# Patient Record
Sex: MALE | Race: BLACK OR AFRICAN AMERICAN | NOT HISPANIC OR LATINO
[De-identification: names, ages, dates, MRNs, and addresses within clinical notes are randomized per-mention and may not be internally consistent; named-entity substitution may affect disease eponyms.]

---

## 2017-01-26 ENCOUNTER — OTHER (OUTPATIENT)
Age: 22
End: 2017-01-26

## 2018-12-07 ENCOUNTER — EMERGENCY (EMERGENCY)
Facility: HOSPITAL | Age: 23
LOS: 1 days | Discharge: HOME | End: 2018-12-07
Attending: EMERGENCY MEDICINE | Admitting: EMERGENCY MEDICINE
Payer: MEDICAID

## 2018-12-07 VITALS
SYSTOLIC BLOOD PRESSURE: 170 MMHG | RESPIRATION RATE: 18 BRPM | DIASTOLIC BLOOD PRESSURE: 80 MMHG | HEART RATE: 88 BPM | TEMPERATURE: 98 F

## 2018-12-07 VITALS
RESPIRATION RATE: 20 BRPM | DIASTOLIC BLOOD PRESSURE: 76 MMHG | OXYGEN SATURATION: 100 % | SYSTOLIC BLOOD PRESSURE: 157 MMHG | TEMPERATURE: 98 F | HEART RATE: 72 BPM

## 2018-12-07 LAB
ALBUMIN SERPL ELPH-MCNC: 4.3 G/DL — SIGNIFICANT CHANGE UP (ref 3.3–5)
ALP SERPL-CCNC: 94 U/L — SIGNIFICANT CHANGE UP (ref 40–120)
ALT FLD-CCNC: 24 U/L — SIGNIFICANT CHANGE UP (ref 4–41)
APTT BLD: 37.1 SEC — HIGH (ref 27.5–36.3)
AST SERPL-CCNC: 17 U/L — SIGNIFICANT CHANGE UP (ref 4–40)
BASOPHILS # BLD AUTO: 0.04 K/UL — SIGNIFICANT CHANGE UP (ref 0–0.2)
BASOPHILS NFR BLD AUTO: 0.6 % — SIGNIFICANT CHANGE UP (ref 0–2)
BILIRUB SERPL-MCNC: 0.3 MG/DL — SIGNIFICANT CHANGE UP (ref 0.2–1.2)
BUN SERPL-MCNC: 12 MG/DL — SIGNIFICANT CHANGE UP (ref 7–23)
CALCIUM SERPL-MCNC: 9.5 MG/DL — SIGNIFICANT CHANGE UP (ref 8.4–10.5)
CHLORIDE SERPL-SCNC: 106 MMOL/L — SIGNIFICANT CHANGE UP (ref 98–107)
CO2 SERPL-SCNC: 23 MMOL/L — SIGNIFICANT CHANGE UP (ref 22–31)
CREAT SERPL-MCNC: 1.09 MG/DL — SIGNIFICANT CHANGE UP (ref 0.5–1.3)
EOSINOPHIL # BLD AUTO: 0.22 K/UL — SIGNIFICANT CHANGE UP (ref 0–0.5)
EOSINOPHIL NFR BLD AUTO: 3 % — SIGNIFICANT CHANGE UP (ref 0–6)
GLUCOSE SERPL-MCNC: 83 MG/DL — SIGNIFICANT CHANGE UP (ref 70–99)
HCT VFR BLD CALC: 42.5 % — SIGNIFICANT CHANGE UP (ref 39–50)
HGB BLD-MCNC: 13.6 G/DL — SIGNIFICANT CHANGE UP (ref 13–17)
IMM GRANULOCYTES # BLD AUTO: 0.02 # — SIGNIFICANT CHANGE UP
IMM GRANULOCYTES NFR BLD AUTO: 0.3 % — SIGNIFICANT CHANGE UP (ref 0–1.5)
INR BLD: 1.31 — HIGH (ref 0.88–1.17)
LYMPHOCYTES # BLD AUTO: 2.34 K/UL — SIGNIFICANT CHANGE UP (ref 1–3.3)
LYMPHOCYTES # BLD AUTO: 32.4 % — SIGNIFICANT CHANGE UP (ref 13–44)
MCHC RBC-ENTMCNC: 27.5 PG — SIGNIFICANT CHANGE UP (ref 27–34)
MCHC RBC-ENTMCNC: 32 % — SIGNIFICANT CHANGE UP (ref 32–36)
MCV RBC AUTO: 85.9 FL — SIGNIFICANT CHANGE UP (ref 80–100)
MONOCYTES # BLD AUTO: 0.67 K/UL — SIGNIFICANT CHANGE UP (ref 0–0.9)
MONOCYTES NFR BLD AUTO: 9.3 % — SIGNIFICANT CHANGE UP (ref 2–14)
NEUTROPHILS # BLD AUTO: 3.93 K/UL — SIGNIFICANT CHANGE UP (ref 1.8–7.4)
NEUTROPHILS NFR BLD AUTO: 54.4 % — SIGNIFICANT CHANGE UP (ref 43–77)
NRBC # FLD: 0 — SIGNIFICANT CHANGE UP
PLATELET # BLD AUTO: 315 K/UL — SIGNIFICANT CHANGE UP (ref 150–400)
PMV BLD: 9.1 FL — SIGNIFICANT CHANGE UP (ref 7–13)
POTASSIUM SERPL-MCNC: 3.9 MMOL/L — SIGNIFICANT CHANGE UP (ref 3.5–5.3)
POTASSIUM SERPL-SCNC: 3.9 MMOL/L — SIGNIFICANT CHANGE UP (ref 3.5–5.3)
PROT SERPL-MCNC: 8 G/DL — SIGNIFICANT CHANGE UP (ref 6–8.3)
PROTHROM AB SERPL-ACNC: 14.7 SEC — HIGH (ref 9.8–13.1)
RBC # BLD: 4.95 M/UL — SIGNIFICANT CHANGE UP (ref 4.2–5.8)
RBC # FLD: 13.7 % — SIGNIFICANT CHANGE UP (ref 10.3–14.5)
SODIUM SERPL-SCNC: 141 MMOL/L — SIGNIFICANT CHANGE UP (ref 135–145)
WBC # BLD: 7.22 K/UL — SIGNIFICANT CHANGE UP (ref 3.8–10.5)
WBC # FLD AUTO: 7.22 K/UL — SIGNIFICANT CHANGE UP (ref 3.8–10.5)

## 2018-12-07 PROCEDURE — 99284 EMERGENCY DEPT VISIT MOD MDM: CPT

## 2018-12-07 NOTE — ED ADULT NURSE NOTE - NSIMPLEMENTINTERV_GEN_ALL_ED
Implemented All Universal Safety Interventions:  [place] to call system. Call bell, personal items and telephone within reach. Instruct patient to call for assistance. Room bathroom lighting operational. Non-slip footwear when patient is off stretcher. Physically safe environment: no spills, clutter or unnecessary equipment. Stretcher in lowest position, wheels locked, appropriate side rails in place.

## 2018-12-07 NOTE — ED PROVIDER NOTE - NS ED ROS FT
CONSTITUTIONAL: No weakness, fevers or chills  NECK: No pain or stiffness  RESPIRATORY: No cough, wheezing, hemoptysis; No shortness of breath  CARDIOVASCULAR: No chest pain or palpitations  GASTROINTESTINAL: No abdominal or epigastric pain. No nausea, vomiting, or hematemesis; No diarrhea or constipation. No melena or hematochezia.  GENITOURINARY: No dysuria  NEUROLOGICAL: No numbness or weakness  SKIN: No itching, rashes, or lesions   All other review of systems is negative unless indicated above.

## 2018-12-07 NOTE — ED ADULT NURSE NOTE - OBJECTIVE STATEMENT
23 year old male presents from his MD's office for left leg pain and swelling   pt denies any other complaints   CM on RSR noted no ectopy rate 74   speaking full sentences no SOB noted   PIV placed labs drawn and sent 23 year old male presents from his MD's office for left leg pain and swelling pt was jogging 1 month ago and felt a pulling sensation to his left calf he went to his PMD who prescribed pain meds (pt does not know the name) and states the pain meds helped pt denies chest pain   pt denies any other complaints   CM on RSR noted no ectopy rate 74   speaking full sentences no SOB noted   PIV placed labs drawn and sent

## 2018-12-07 NOTE — ED PROVIDER NOTE - NSFOLLOWUPINSTRUCTIONS_ED_ALL_ED_FT
You were seen in the emergency room for leg swelling and abnormal lab results done in your PCP's office. Review of your PCP's records showed that your D-dimer level was borderline abnormal (normal = <1.39, yours was 1.37) and review of ultrasound showed no clot. Your examination is consistent with a hematoma in the leg, which means a blood collection that can occur after an injury. You should please follow up with the orthopedic doctor within 1-2 weeks who may perform an MRI or other tests to further evaluate the area. Continue to use ice, rest, elevation, and tylenol for pain control. Keep ACE bandage on the leg as much as possible, and take it off to shower and at night before sleeping.     Call your doctor or return to the ED immediately if you have any of the following:    •Swelling or pain in your leg (often in just one leg)  •Sudden, continuous pain deep in a muscle  •Pain that worsens when you are active or when you stand still for a long time  •Chest pain  •Sudden shortness of breath  •Cough with blood or bloody sputum  •Bruises  •Heavy or uncontrolled bleeding  •Blood in your urine, stool, or vomit  •Black or tarry stools

## 2018-12-07 NOTE — ED PROVIDER NOTE - OBJECTIVE STATEMENT
22 yo M no PMHx no PSHx sent in by PCP. Patient was jogging two months ago, felt sharp pulling sensation in his L. calf and behind knee. Tried rest, supportive measures with no relief. Went to see PCP 4 days ago who prescribed pain medication (pt does not know which one) and ordered D dimer which was elevated (1.39 per patient record on phone), and ordered lower ext US which showed hematoma of L. calf and patent lower ext veins bilaterally. Patient denies CP, SOB, HA, blurry vision. Endorses mild fatigue. Denies recent prolonged car or plane travel or otherwise sedentary periods. Works in a local [employer], on feet frequently. No personal or family history of VTE.

## 2018-12-07 NOTE — ED ADULT TRIAGE NOTE - CHIEF COMPLAINT QUOTE
Pt sent by PMD for high d-dimer --pt was c/o swelling in left leg and was sent for dopplers which were negative--pt called by PMD and told to go to ER for possible PE

## 2018-12-07 NOTE — SBIRT NOTE. - NSSBIRTSERVICES_GEN_A_ED_FT
Provided SBIRT services: Full screen Negative. Positive reinforcement provided given patient currently within healthy guidelines. Education materials reviewed and given to patient.  Audit Score: 4  DAST Score: 0  Duration = 10 Minutes

## 2018-12-07 NOTE — ED PROVIDER NOTE - MEDICAL DECISION MAKING DETAILS
22 yo M with no known risk factors for PE, no SOB or CP, sent in by PCP for elevate D dimer. Previous lower ext dopplers negative, will repeat now, check CBC, CMP, coags. Mckeon: 24 yo M with no known risk factors for PE, no SOB or CP, sent in by PCP for elevated D dimer. Previous lower ext dopplers negative for DVT but showing calf hematoma, will repeat now to confirm no DVT Mike: 22 yo M with no known risk factors for PE, no SOB or CP, sent in by PCP for elevated D dimer. Mike: Previous lower ext dopplers negative for DVT but showing calf hematoma, will repeat now to confirm no DVT

## 2018-12-07 NOTE — ED PROVIDER NOTE - PHYSICAL EXAMINATION
GENERAL: Overweight young male in NAD, sitting up, appears mildly anxious/fidgeting  HEAD:  Atraumatic, Normocephalic  ENT: EOMI, PERRLA, conjunctiva and sclera clear, Neck supple, No JVD  CHEST/LUNG: Clear to auscultation bilaterally; No wheeze, equal breath sounds bilaterally   HEART: Regular rate and rhythm; No murmurs, rubs, or gallops  ABDOMEN: Soft, Nontender, Nondistended; Bowel sounds present, no organomegaly  EXTREMITIES:  L calf diameter >R, +Darshan's sigh on L, mild tenderness to calf palpation. No erythema, warmth or skin rashes. + L. knee edema, nontender to palpation. 2+ Peripheral Pulses.   PSYCH: AAOx3  NEUROLOGY: non-focal, cranial nerves intact  SKIN: Normal color, No rashes or lesions

## 2020-09-03 NOTE — ED PROVIDER NOTE - MARITAL STATUS
Returned patient's wife's call at approximately 6:15 p m  on 09/02  He has not made urine today  His primary care physician suggested he have a paracenteses  I discussed that he should go to the emergency room since he has not made urine and his appetite is poor  The patient's wife said they do not have transportation since their daughter has their car  I told her to call 911 and take him to the emergency room to be evaluated  She stated that she would discuss this with her   I strongly urged her to take him to the emergency room since he could go into renal failure and potentially death  I discussed call 911 and multiple times  She stated that she would think about it  Single

## 2023-05-04 ENCOUNTER — EMERGENCY (EMERGENCY)
Facility: HOSPITAL | Age: 28
LOS: 1 days | Discharge: HOME | End: 2023-05-04
Admitting: STUDENT IN AN ORGANIZED HEALTH CARE EDUCATION/TRAINING PROGRAM
Payer: COMMERCIAL

## 2023-05-04 VITALS
DIASTOLIC BLOOD PRESSURE: 107 MMHG | SYSTOLIC BLOOD PRESSURE: 156 MMHG | TEMPERATURE: 99 F | HEART RATE: 103 BPM | OXYGEN SATURATION: 98 % | RESPIRATION RATE: 18 BRPM

## 2023-05-04 PROCEDURE — 73590 X-RAY EXAM OF LOWER LEG: CPT | Mod: 26,RT

## 2023-05-04 PROCEDURE — 73610 X-RAY EXAM OF ANKLE: CPT | Mod: 26,RT

## 2023-05-04 PROCEDURE — 73630 X-RAY EXAM OF FOOT: CPT | Mod: 26,RT

## 2023-05-04 PROCEDURE — 99285 EMERGENCY DEPT VISIT HI MDM: CPT

## 2023-05-04 PROCEDURE — 99221 1ST HOSP IP/OBS SF/LOW 40: CPT

## 2023-05-04 PROCEDURE — 73562 X-RAY EXAM OF KNEE 3: CPT | Mod: 26,RT

## 2023-05-04 NOTE — ED PROVIDER NOTE - NSFOLLOWUPINSTRUCTIONS_ED_ALL_ED_FT
Follow up with orthopedic surgeon Dr. Preet Go this coming week.  Please call office today in daytime to schedule appointment; be sure to let office staff know this is a follow up to your Emergency Department visit.  You were treated for a "RIGHT bertha equivalent ankle fracture".  **Bring your discharge papers / test results with you to your follow up appointment.    Preet Go)  Orthopaedic Surgery  [address] [place] [address], [address]  [place] [address]  Phone: [phone]  Fax: [phone]  ------------------------------------------------------------------------------------------------------------------------  Please also notify your primary medical doctor of your ER visit and follow up accordingly.  **Bring your discharge papers / test results with you to your follow up appointment.    If you ever need assistance finding a doctor to follow up with, you may call the [facility] Patient Access Services helpline at [phone] to find names/contact #s for a provider/specialist to follow up with.  ------------------------------------------------------------------------------------------------------------------------    Rest / no strenuous activity.  ELEVATE injured extremity; move parts of your leg that you can; keep cast CLEAN AND DRY; do not stick objects inside cast.    Use crutches as instructed.  NEVER use crutches on stairs.    A copy of your test results is being provided to you.  All results including incidental findings were reviewed at discharge.  Should you have any questions that arise after you are discharged, please feel free to contact the ER and ask to speak to the Administrative PA or resident to have your questions answered.    You CANNOT drive with your injury; please do not attempt to do so.    MEDICATIONS:  See attached medication sheet for details of prescriptions sent to your pharmacy.  These medication prescription details were reviewed with you; please make sure to take all medications AS PRESCRIBED.    Oxycodone/acetaminophen (Percocet) medication will cause drowsiness and slowed reflexes.  Move slowly/carefully and have others assist you as much as possible.    ***Return to the Emergency Department IMMEDIATELY if you experience any new / worsening symptoms or have any problems / concerns.***

## 2023-05-04 NOTE — ED PROVIDER NOTE - OBJECTIVE STATEMENT
28 yo M with no pertinent past medical history presenting with right ankle pain x 2 days after walking in the rain on a hill, slipping and losing balance, rolling his right ankle. He has not been able to bear weight on the right ankle. He treated the ankle pain at home with an ace wrap and got crutches to assist with ambulation.   He reports he does not like taking medications and has not taken nsaids or tylenol. He denies calf pain, numbness or tingling, CP, SOB, fevers, chills, night sweats. 26 yo M with no pertinent past medical history presenting with right ankle pain x 2 days after walking in the rain on a hill, slipping and losing balance, rolling his right ankle. He reports hearing a pop at the time of injury. Right ankle has since swelled, he feels pain in the ankle, 7/10, and lower 1/3 of the RLE. He has not been able to bear weight on the right ankle. He treated the ankle pain at home with an ace wrap and got crutches to assist with ambulation. He reports he does not like taking medications and has not taken nsaids or tylenol. He denies calf pain, numbness or tingling, CP, SOB, fevers, chills, night sweats.

## 2023-05-04 NOTE — ED PROVIDER NOTE - MUSCULOSKELETAL, MLM
Ecchymosis noted around medial malleolus. Large amount of edema across ankle and foot. RLE ROM significantly limited by pain and edema. Pulses 2+ bilaterally, cap refill < 2s. Patient unable to walk due to pain. No calf tenderness. TTP along distal fibula and syndesmosis.

## 2023-05-04 NOTE — ED PROVIDER NOTE - PROGRESS NOTE DETAILS
XR findings: Acute oblique, displaced and angulated fracture of the right distal   fibular diaphysis. There is superolateral displacement of the distal   fracture fragment. Redemonstrated widening of the medial ankle clear   space. No additional fractures are visualized.    Plan - ortho consulted for XR findings GISELLE Larsen Addendum----This patient was signed out to me by GISELLE Ware pending Ortho consultation/management/recommendations.  In the interim, pt objectively noted to be resting comfortably; pt has been clinically stable; Ortho consulted shortly after sign-out was performed; pt was casted and had post-reduction imaging performed with Ortho in accompaniment.  Ortho team had advised CT imaging to assist in outpatient operative planning; Ortho stated pt did not need to wait for radiology results.  Ortho team attempted to expedite CT imaging after post-reduction imaging was performed; CT had many ED patients in queue.  In interim, pt has been waiting for CT imaging to be performed; pt is still awaiting imaging to be performed at this time.  Ortho came down to reassess pt; state pt may be dc'd home without CT imaging due to prolonged wait times for CT.  Will dc home per below instructions. GISELLE LU: Pt was seen with PA student Mai Orozco.

## 2023-05-04 NOTE — ED PROVIDER NOTE - CLINICAL SUMMARY MEDICAL DECISION MAKING FREE TEXT BOX
28 yo M with no pertinent past medical history presenting with right ankle pain x 2 days after walking in the rain on a hill, slipping and losing balance, rolling his right ankle.     Plan - XR of R foot and R ankle due to significant edema, ecchymosis, and limited ROM. Patient denies pain medication at this time.

## 2023-05-04 NOTE — ED ADULT NURSE NOTE - NSIMPLEMENTINTERV_GEN_ALL_ED
Implemented All Fall Risk Interventions:  [place] to call system. Call bell, personal items and telephone within reach. Instruct patient to call for assistance. Room bathroom lighting operational. Non-slip footwear when patient is off stretcher. Physically safe environment: no spills, clutter or unnecessary equipment. Stretcher in lowest position, wheels locked, appropriate side rails in place. Provide visual cue, wrist band, yellow gown, etc. Monitor gait and stability. Monitor for mental status changes and reorient to person, place, and time. Review medications for side effects contributing to fall risk. Reinforce activity limits and safety measures with patient and family.

## 2023-05-04 NOTE — ED PROVIDER NOTE - PATIENT PORTAL LINK FT
You can access the FollowMyHealth Patient Portal offered by [facility] by registering at the following website: [email or web address]. By joining [employer]’s FollowMyHealth portal, you will also be able to view your health information using other applications (apps) compatible with our system.

## 2023-05-04 NOTE — ED ADULT TRIAGE NOTE - CHIEF COMPLAINT QUOTE
pt ambulatory to triage with crutches. pt c/o right ankle pain x 2 days. pt states he slipped while walking down a hill and heard a crack on his ankle.  pt states he is not able to bear weight on ankle. right ankle noted to swollen and with ecchymosis. denies pmh.

## 2023-05-04 NOTE — ED ADULT NURSE NOTE - OBJECTIVE STATEMENT
Patient received in stretcher. AOX4. Respirations even and unlabored. Spontaneous movement of all extremities noted. Presents to ER c/o R ankle injury. Evaluated by ER MD. Patient returned from XR. Comfort and safety maintained. All current care needs met. Care plan continued Vidal SANON

## 2023-05-05 PROCEDURE — 73700 CT LOWER EXTREMITY W/O DYE: CPT | Mod: 26,RT,MA

## 2023-05-05 PROCEDURE — 73610 X-RAY EXAM OF ANKLE: CPT | Mod: 26,RT

## 2023-05-05 RX ORDER — OXYCODONE AND ACETAMINOPHEN 5; 325 MG/1; MG/1
1 TABLET ORAL
Qty: 20 | Refills: 0
Start: 2023-05-05

## 2023-05-05 NOTE — CONSULT NOTE ADULT - SUBJECTIVE AND OBJECTIVE BOX
HPI  27yMale c/o R ankle pain s/p mechanical slip and fall 2 days ago. Has put weight on the RLE since the fall. Denies headstrike or LOC. Denies numbness/tingling in the RLE. Denies any other trauma/injuries at this time. At baseline, community ambulator w/o assistive devices. Pt says he's interested in surgery, but wants to go home, consider options, and has to work tomorrow/this weekend..    ROS  Negative unless otherwise specified in HPI.    PAST MEDICAL & SURGICAL Hx  PAST MEDICAL & SURGICAL HISTORY:  No pertinent past medical history  No significant past surgical history    MEDICATIONS  Home Medications    ALLERGIES  No Known Allergies    FAMILY Hx  FAMILY HISTORY:  Family history of cerebral aneurysm (Father)    VITALS  Vital Signs Last 24 Hrs  T(C): 37.2 (04 May 2023 16:49), Max: 37.2 (04 May 2023 16:49)  T(F): 99 (04 May 2023 16:49), Max: 99 (04 May 2023 16:49)  HR: 103 (04 May 2023 16:49) (103 - 103)  BP: 156/107 (04 May 2023 16:49) (156/107 - 156/107)  RR: 18 (04 May 2023 16:49) (18 - 18)  SpO2: 98% (04 May 2023 16:49) (98% - 98%)  Parameters below as of 04 May 2023 16:49  Patient On (Oxygen Delivery Method): room air    PHYSICAL EXAM  Gen: Lying in bed, NAD  Resp: No increased WOB  RLE:  Skin intact, +edema over R ankle  +TTP over R ankle, no TTP along remainder of extremity; compartments soft  Limited ROM at ankle 2/2 pain  Motor: TA/EHL/GS/FHL intact  Sensory: DP/SP/Tib/Steve/Saph SILT  +DP pulse, WWP    Secondary survey:  No TTP along other extremities, pelvis grossly stable, SILT and soft compartments throughout      IMAGING  XRs: R bertha equivalent ankle fx (personal read)    PROCEDURE  Using aseptic technique, a hematoma block was administered to the fracture site using 10cc of 1% lidocaine without epinephrine. Closed reduction was subsequently performed and a well-padded, well-molded plaster splint applied. The patient tolerated the procedure well without evidence of complications. The patient was neurovascularly intact following reduction. Post-reduction XRs demonstrated acceptable alignment. The patient was informed about splint precautions (keep dry, do not stick anything inside, monitor for signs/symptoms of increased compartmental pressure: uncontrolled pain, worsening numbness/tingling, severe pain with movement of the fingers/toes) and verbalized understanding.    ASSESSMENT & PLAN  27yMale w/ R bertha equivalent ankle fx s/p closed reduction and immobilization.  -NWB RLE in a short-leg trilam splint  -splint precautions  -pain control  -ice/cold compress, elevation  -no acute ortho surgery at this time  -f/u outpt with Dr. Go within 1 week, call office for appt HPI  27yMale c/o R ankle pain s/p mechanical slip and fall 2 days ago. Has put weight on the RLE since the fall. Denies headstrike or LOC. Denies numbness/tingling in the RLE. Denies any other trauma/injuries at this time. At baseline, community ambulator w/o assistive devices. Pt says he's interested in surgery, but wants to go home, consider options, and has to work tomorrow/this weekend..    ROS  Negative unless otherwise specified in HPI.    PAST MEDICAL & SURGICAL Hx  PAST MEDICAL & SURGICAL HISTORY:  No pertinent past medical history  No significant past surgical history    MEDICATIONS  Home Medications    ALLERGIES  No Known Allergies    FAMILY Hx  FAMILY HISTORY:  Family history of cerebral aneurysm (Father)    VITALS  Vital Signs Last 24 Hrs  T(C): 37.2 (04 May 2023 16:49), Max: 37.2 (04 May 2023 16:49)  T(F): 99 (04 May 2023 16:49), Max: 99 (04 May 2023 16:49)  HR: 103 (04 May 2023 16:49) (103 - 103)  BP: 156/107 (04 May 2023 16:49) (156/107 - 156/107)  RR: 18 (04 May 2023 16:49) (18 - 18)  SpO2: 98% (04 May 2023 16:49) (98% - 98%)  Parameters below as of 04 May 2023 16:49  Patient On (Oxygen Delivery Method): room air    PHYSICAL EXAM  Gen: Lying in bed, NAD  Resp: No increased WOB  RLE:  Skin intact, +edema over R ankle  +TTP over R ankle, no TTP along remainder of extremity; compartments soft  Limited ROM at ankle 2/2 pain  Motor: TA/EHL/GS/FHL intact  Sensory: DP/SP/Tib/Steve/Saph SILT  +DP pulse, WWP    Secondary survey:  No TTP along other extremities, pelvis grossly stable, SILT and soft compartments throughout      IMAGING  XRs: R bertha equivalent ankle fx (personal read)    PROCEDURE  Using aseptic technique, a hematoma block was administered to the fracture site using 10cc of 1% lidocaine without epinephrine. Closed reduction was subsequently performed and a well-padded, well-molded plaster splint applied. The patient tolerated the procedure well without evidence of complications. The patient was neurovascularly intact following reduction. Post-reduction XRs demonstrated acceptable alignment. The patient was informed about splint precautions (keep dry, do not stick anything inside, monitor for signs/symptoms of increased compartmental pressure: uncontrolled pain, worsening numbness/tingling, severe pain with movement of the fingers/toes) and verbalized understanding.    ASSESSMENT & PLAN  27yMale w/ R bertha equivalent ankle fx s/p closed reduction and immobilization.  -NWB RLE in a short-leg trilam splint  -splint precautions  -pain control  -ice/cold compress, strict elevation as much as possible  -no acute ortho surgery at this time  -f/u outpt with Dr. Go within 1 week, call office for appt

## 2023-05-08 ENCOUNTER — APPOINTMENT (OUTPATIENT)
Dept: ORTHOPEDIC SURGERY | Facility: CLINIC | Age: 28
End: 2023-05-08
Payer: COMMERCIAL

## 2023-05-08 PROCEDURE — 99214 OFFICE O/P EST MOD 30 MIN: CPT

## 2023-05-15 ENCOUNTER — APPOINTMENT (OUTPATIENT)
Dept: ORTHOPEDIC SURGERY | Facility: CLINIC | Age: 28
End: 2023-05-15

## 2023-05-15 ENCOUNTER — TRANSCRIPTION ENCOUNTER (OUTPATIENT)
Age: 28
End: 2023-05-15

## 2023-05-16 ENCOUNTER — TRANSCRIPTION ENCOUNTER (OUTPATIENT)
Age: 28
End: 2023-05-16

## 2023-05-16 ENCOUNTER — OUTPATIENT (OUTPATIENT)
Dept: INPATIENT UNIT | Facility: HOSPITAL | Age: 28
LOS: 1 days | End: 2023-05-16
Payer: COMMERCIAL

## 2023-05-16 ENCOUNTER — APPOINTMENT (OUTPATIENT)
Dept: ORTHOPEDIC SURGERY | Facility: HOSPITAL | Age: 28
End: 2023-05-16

## 2023-05-16 VITALS
TEMPERATURE: 98 F | SYSTOLIC BLOOD PRESSURE: 139 MMHG | RESPIRATION RATE: 16 BRPM | OXYGEN SATURATION: 97 % | HEART RATE: 85 BPM | DIASTOLIC BLOOD PRESSURE: 85 MMHG

## 2023-05-16 VITALS
HEART RATE: 87 BPM | DIASTOLIC BLOOD PRESSURE: 81 MMHG | HEIGHT: 75 IN | RESPIRATION RATE: 16 BRPM | OXYGEN SATURATION: 95 % | SYSTOLIC BLOOD PRESSURE: 156 MMHG | TEMPERATURE: 99 F | WEIGHT: 315 LBS

## 2023-05-16 DIAGNOSIS — S93.431A SPRAIN OF TIBIOFIBULAR LIGAMENT OF RIGHT ANKLE, INITIAL ENCOUNTER: ICD-10-CM

## 2023-05-16 DIAGNOSIS — S82.841A DISPLACED BIMALLEOLAR FRACTURE OF RIGHT LOWER LEG, INITIAL ENCOUNTER FOR CLOSED FRACTURE: ICD-10-CM

## 2023-05-16 LAB
BASOPHILS # BLD AUTO: 0.04 K/UL — SIGNIFICANT CHANGE UP (ref 0–0.2)
BASOPHILS NFR BLD AUTO: 0.5 % — SIGNIFICANT CHANGE UP (ref 0–2)
EOSINOPHIL # BLD AUTO: 0.16 K/UL — SIGNIFICANT CHANGE UP (ref 0–0.5)
EOSINOPHIL NFR BLD AUTO: 2.2 % — SIGNIFICANT CHANGE UP (ref 0–6)
HCT VFR BLD CALC: 43.1 % — SIGNIFICANT CHANGE UP (ref 39–50)
HGB BLD-MCNC: 14.2 G/DL — SIGNIFICANT CHANGE UP (ref 13–17)
IMM GRANULOCYTES NFR BLD AUTO: 0.4 % — SIGNIFICANT CHANGE UP (ref 0–0.9)
LYMPHOCYTES # BLD AUTO: 2.53 K/UL — SIGNIFICANT CHANGE UP (ref 1–3.3)
LYMPHOCYTES # BLD AUTO: 34.1 % — SIGNIFICANT CHANGE UP (ref 13–44)
MCHC RBC-ENTMCNC: 26.3 PG — LOW (ref 27–34)
MCHC RBC-ENTMCNC: 32.9 GM/DL — SIGNIFICANT CHANGE UP (ref 32–36)
MCV RBC AUTO: 79.8 FL — LOW (ref 80–100)
MONOCYTES # BLD AUTO: 0.5 K/UL — SIGNIFICANT CHANGE UP (ref 0–0.9)
MONOCYTES NFR BLD AUTO: 6.7 % — SIGNIFICANT CHANGE UP (ref 2–14)
NEUTROPHILS # BLD AUTO: 4.16 K/UL — SIGNIFICANT CHANGE UP (ref 1.8–7.4)
NEUTROPHILS NFR BLD AUTO: 56.1 % — SIGNIFICANT CHANGE UP (ref 43–77)
NRBC # BLD: 0 /100 WBCS — SIGNIFICANT CHANGE UP (ref 0–0)
PLATELET # BLD AUTO: 345 K/UL — SIGNIFICANT CHANGE UP (ref 150–400)
RBC # BLD: 5.4 M/UL — SIGNIFICANT CHANGE UP (ref 4.2–5.8)
RBC # FLD: 14.6 % — HIGH (ref 10.3–14.5)
WBC # BLD: 7.42 K/UL — SIGNIFICANT CHANGE UP (ref 3.8–10.5)
WBC # FLD AUTO: 7.42 K/UL — SIGNIFICANT CHANGE UP (ref 3.8–10.5)

## 2023-05-16 PROCEDURE — C1713: CPT

## 2023-05-16 PROCEDURE — 27829 TREAT LOWER LEG JOINT: CPT | Mod: RT

## 2023-05-16 PROCEDURE — 85025 COMPLETE CBC W/AUTO DIFF WBC: CPT

## 2023-05-16 PROCEDURE — 97165 OT EVAL LOW COMPLEX 30 MIN: CPT

## 2023-05-16 PROCEDURE — 27792 TREATMENT OF ANKLE FRACTURE: CPT | Mod: RT

## 2023-05-16 PROCEDURE — C9399: CPT

## 2023-05-16 PROCEDURE — C1889: CPT

## 2023-05-16 PROCEDURE — 97161 PT EVAL LOW COMPLEX 20 MIN: CPT

## 2023-05-16 PROCEDURE — 27814 TREATMENT OF ANKLE FRACTURE: CPT | Mod: RT

## 2023-05-16 PROCEDURE — 76000 FLUOROSCOPY <1 HR PHYS/QHP: CPT

## 2023-05-16 DEVICE — KIT REPAIR SS TIGHTROPE W/DRV SYNDESMOSIS: Type: IMPLANTABLE DEVICE | Site: RIGHT | Status: FUNCTIONAL

## 2023-05-16 DEVICE — PLATE 1/3 TUB LCP W/COLLAR 10H 117MM: Type: IMPLANTABLE DEVICE | Site: RIGHT | Status: FUNCTIONAL

## 2023-05-16 DEVICE — SCREW CORT S-T 3.5X18MM: Type: IMPLANTABLE DEVICE | Site: RIGHT | Status: FUNCTIONAL

## 2023-05-16 DEVICE — SCREW CORTEX S-T 2.7X20MM: Type: IMPLANTABLE DEVICE | Site: RIGHT | Status: FUNCTIONAL

## 2023-05-16 DEVICE — K-WIRE SYNTHES (THREADED) TROCAR POINT 1.6MM X 150MM: Type: IMPLANTABLE DEVICE | Site: RIGHT | Status: FUNCTIONAL

## 2023-05-16 DEVICE — SCREW CORT S-T 3.5X14MM: Type: IMPLANTABLE DEVICE | Site: RIGHT | Status: FUNCTIONAL

## 2023-05-16 DEVICE — SCREW CORT S-T 3.5X16MM: Type: IMPLANTABLE DEVICE | Site: RIGHT | Status: FUNCTIONAL

## 2023-05-16 RX ORDER — PANTOPRAZOLE SODIUM 20 MG/1
1 TABLET, DELAYED RELEASE ORAL
Qty: 42 | Refills: 0
Start: 2023-05-16 | End: 2023-06-26

## 2023-05-16 RX ORDER — SODIUM CHLORIDE 9 MG/ML
3 INJECTION INTRAMUSCULAR; INTRAVENOUS; SUBCUTANEOUS EVERY 8 HOURS
Refills: 0 | Status: DISCONTINUED | OUTPATIENT
Start: 2023-05-16 | End: 2023-05-16

## 2023-05-16 RX ORDER — ASPIRIN/CALCIUM CARB/MAGNESIUM 324 MG
1 TABLET ORAL
Qty: 84 | Refills: 0
Start: 2023-05-16 | End: 2023-06-26

## 2023-05-16 RX ORDER — OXYCODONE HYDROCHLORIDE 5 MG/1
1 TABLET ORAL
Qty: 20 | Refills: 0
Start: 2023-05-16

## 2023-05-16 RX ORDER — SODIUM CHLORIDE 9 MG/ML
1000 INJECTION, SOLUTION INTRAVENOUS
Refills: 0 | Status: DISCONTINUED | OUTPATIENT
Start: 2023-05-16 | End: 2023-05-25

## 2023-05-16 RX ORDER — HYDROMORPHONE HYDROCHLORIDE 2 MG/ML
0.5 INJECTION INTRAMUSCULAR; INTRAVENOUS; SUBCUTANEOUS
Refills: 0 | Status: DISCONTINUED | OUTPATIENT
Start: 2023-05-16 | End: 2023-05-16

## 2023-05-16 RX ORDER — ONDANSETRON 8 MG/1
4 TABLET, FILM COATED ORAL ONCE
Refills: 0 | Status: COMPLETED | OUTPATIENT
Start: 2023-05-16 | End: 2023-05-16

## 2023-05-16 RX ADMIN — HYDROMORPHONE HYDROCHLORIDE 0.5 MILLIGRAM(S): 2 INJECTION INTRAMUSCULAR; INTRAVENOUS; SUBCUTANEOUS at 16:09

## 2023-05-16 RX ADMIN — HYDROMORPHONE HYDROCHLORIDE 0.5 MILLIGRAM(S): 2 INJECTION INTRAMUSCULAR; INTRAVENOUS; SUBCUTANEOUS at 16:40

## 2023-05-16 RX ADMIN — ONDANSETRON 4 MILLIGRAM(S): 8 TABLET, FILM COATED ORAL at 16:09

## 2023-05-16 NOTE — ASU DISCHARGE PLAN (ADULT/PEDIATRIC) - NS MD DC FALL RISK RISK
For information on Fall & Injury Prevention, visit: [email or web address] OR  [email or web address] OR  https://www.cdc.gov/steadi/patient.html

## 2023-05-16 NOTE — PHYSICAL THERAPY INITIAL EVALUATION ADULT - ACTIVE RANGE OF MOTION EXAMINATION, REHAB EVAL
except R Ankle/bilateral upper extremity Active ROM was WFL (within functional limits)/bilateral  lower extremity Active ROM was WFL (within functional limits)

## 2023-05-16 NOTE — ASU DISCHARGE PLAN (ADULT/PEDIATRIC) - ASU DC SPECIAL INSTRUCTIONSFT
DVT PROPHYLAXIS: Please take aspirin (Ecotrin) 325mg 2x/day to prevent blood clots.    WOUND CARE: Do not remove dressing until follow up. Keep dressing/incision clean, dry, and intact.    BATHING: You may sponge bathe and/or shower beginning 3 days following surgery; however, you MUST keep your splint CLEAN and DRY. You may purchase a waterproof cast/splint bag to cover your splint.    ACTIVITY: Your weight-bearing status is non-weightbearing in the right leg. Keep your right ankle elevated as much as possible to decrease pain and swelling.  If you are taking narcotic pain medication (such as oxycodone), do NOT drive a car, operate machinery, or make important decisions.    DIET: Return to your usual diet.    NOTIFY YOUR SURGEON IF: You have any bleeding that does not stop, any pus draining from your wound, any fever (over 100.4 F) or chills, persistent nausea/vomiting, persistent diarrhea, or if your pain is not controlled on your discharge pain medications.    PAIN CONTROL: Please take medication as prescribed. If you have been prescribed a narcotic (such as oxycodone), please be aware that narcotic pain medicine can cause extreme nausea and constipation. Drink plenty of water and take stool softeners/laxatives (e.g., Colace, Miralax) as needed. You can get them from your local pharmacy. If you have been prescribed a narcotic (such as oxycodone), please take for severe pain only. You can take up to eight Tylenol 325 mg per day while taking oxycodone. Please do not take NSAIDs (Motrin, etc).    FOLLOW-UP:  1. Follow-up with Dr. Go in 2 weeks following discharge.  Please call office for appointment if you do not already have one.  2. Please also follow up with your primary care physician within 1-2 weeks. Call his/her office to make an appointment.

## 2023-05-16 NOTE — PHYSICAL THERAPY INITIAL EVALUATION ADULT - PERTINENT HX OF CURRENT PROBLEM, REHAB EVAL
27y Male community ambulator without assist presents for surgery after sustaining a right ankle fracture s/p fall on 5/3. Patient was reduced and splint in [facility] ED on 5/5 and had follow up with Dr. Go who indicated him for surgery. Denies numbness/tingling. No other pain/injuries. Denies fevers/chills. Denies PMH.

## 2023-05-16 NOTE — PHYSICAL THERAPY INITIAL EVALUATION ADULT - ADDITIONAL COMMENTS
Pt lives w/ his mother in pvt home +6 steps at entry +BHR, was independent prior to fx, now uses Axillary crutches and knee scooter.

## 2023-05-16 NOTE — OCCUPATIONAL THERAPY INITIAL EVALUATION ADULT - PERTINENT HX OF CURRENT PROBLEM, REHAB EVAL
27y Male community ambulator without assist presents for surgery after sustaining a right ankle fracture s/p fall on 5/3. Patient was reduced and splint in [facility] ED on 5/5 and had follow up with Dr. Go who indicated him for surgery. Denies numbness/tingling. No other pain/injuries. Denies fevers/chills. Denies PMH.

## 2023-05-16 NOTE — ASU DISCHARGE PLAN (ADULT/PEDIATRIC) - CARE PROVIDER_API CALL
Preet Go (MD)  Orthopaedic Surgery  [address]  [place] [address]  Phone: [phone]  Fax: [phone]  Follow Up Time: 2 weeks

## 2023-05-16 NOTE — ASU PATIENT PROFILE, ADULT - FALL HARM RISK - UNIVERSAL INTERVENTIONS
Bed in lowest position, wheels locked, appropriate side rails in place/Call bell, personal items and telephone in reach/Instruct patient to call for assistance before getting out of bed or chair/Non-slip footwear when patient is out of bed/[place] to call system/Physically safe environment - no spills, clutter or unnecessary equipment/Purposeful Proactive Rounding/Room/bathroom lighting operational, light cord in reach

## 2023-05-16 NOTE — OCCUPATIONAL THERAPY INITIAL EVALUATION ADULT - LIVES WITH, PROFILE
Pt lives alone however s/p discharge pt will be staying with mother in [place] with 6 BLAZE. Pt has a walk in shower./alone

## 2023-05-16 NOTE — PROGRESS NOTE ADULT - SUBJECTIVE AND OBJECTIVE BOX
ORTHO ATTENDING POST OP    s/p ORIF R ankle  NWB R  LE  AO splint  elevation   BID for DVT ppx.  oxy to pharmacy  f/u 2 weeks

## 2023-05-16 NOTE — OCCUPATIONAL THERAPY INITIAL EVALUATION ADULT - TOILETING, PREVIOUS LEVEL OF FUNCTION, OT EVAL
CC:  Ashley DOMINIQUE Madisons is here today for a follow up on hypertension and diabetes.    Medications: medications verified and updated  Refills needed today?  NO  Denies known Latex allergy or symptoms of Latex sensitivity.  Patient would like communication of their results via:      Cell Phone:   Telephone Information:   Mobile [phone]     Okay to leave a message containing results? Yes  Tobacco history: verified    There are no preventive care reminders to display for this patient.  Patient is up to date, no discussion needed..    [facility] status addressed. Patient Active.            independent

## 2023-05-16 NOTE — H&P ADULT - HISTORY OF PRESENT ILLNESS
27y Male community ambulator without assist presents for surgery after sustaining a right ankle fracture s/p fall on 5/3. Patient was reduced and splint in [facility] ED on 5/5 and had follow up with Dr. Go who indicated him for surgery. Denies numbness/tingling. No other pain/injuries. Denies fevers/chills. Denies PMH.    HEALTH ISSUES - PROBLEM Dx:        MEDICATIONS  (STANDING):  sodium chloride 0.9% lock flush 3 milliLiter(s) IV Push every 8 hours    Allergies    No Known Allergies    Intolerances                  Vital Signs Last 24 Hrs  T(C): --  T(F): --  HR: --  BP: --  BP(mean): --  RR: --  SpO2: --      Physical Exam  Gen: NAD  RLE  splint CDI  compartments soft where accessible through splint  calves NTTP  +EHL/FHL  SILT across toes  toes wwp      A/P: 27y Male with R bertha equivalent ankle fracture    admit to ortho  plan for ORIF 5/16  NPO and hold chemical DVT ppx for OR today  Pain control  NWB R LE in splint, keep c/d/I, cane/crutches/walker as needed  Ice/elevation  discussed with Dr. Go who agrees with plan

## 2023-05-18 RX ORDER — OXYCODONE AND ACETAMINOPHEN 5; 325 MG/1; MG/1
5-325 TABLET ORAL EVERY 6 HOURS
Qty: 12 | Refills: 0 | Status: ACTIVE | COMMUNITY
Start: 2023-05-18 | End: 1900-01-01

## 2023-06-05 ENCOUNTER — APPOINTMENT (OUTPATIENT)
Dept: ORTHOPEDIC SURGERY | Facility: CLINIC | Age: 28
End: 2023-06-05
Payer: COMMERCIAL

## 2023-06-05 VITALS — HEIGHT: 76 IN | WEIGHT: 315 LBS | BODY MASS INDEX: 38.36 KG/M2

## 2023-06-05 PROCEDURE — 99024 POSTOP FOLLOW-UP VISIT: CPT

## 2023-06-05 PROCEDURE — 73610 X-RAY EXAM OF ANKLE: CPT | Mod: RT

## 2023-06-19 ENCOUNTER — APPOINTMENT (OUTPATIENT)
Dept: ORTHOPEDIC SURGERY | Facility: CLINIC | Age: 28
End: 2023-06-19
Payer: COMMERCIAL

## 2023-06-19 VITALS
TEMPERATURE: 97.3 F | WEIGHT: 315 LBS | BODY MASS INDEX: 38.36 KG/M2 | DIASTOLIC BLOOD PRESSURE: 60 MMHG | HEIGHT: 76 IN | OXYGEN SATURATION: 96 % | SYSTOLIC BLOOD PRESSURE: 96 MMHG | HEART RATE: 98 BPM

## 2023-06-19 PROCEDURE — 99024 POSTOP FOLLOW-UP VISIT: CPT

## 2023-06-19 PROCEDURE — 73610 X-RAY EXAM OF ANKLE: CPT | Mod: RT

## 2023-07-31 ENCOUNTER — APPOINTMENT (OUTPATIENT)
Dept: ORTHOPEDIC SURGERY | Facility: CLINIC | Age: 28
End: 2023-07-31
Payer: COMMERCIAL

## 2023-07-31 DIAGNOSIS — S82.841A DISPLACED BIMALLEOLAR FRACTURE OF RIGHT LOWER LEG, INITIAL ENCOUNTER FOR CLOSED FRACTURE: ICD-10-CM

## 2023-07-31 DIAGNOSIS — S93.431A SPRAIN OF TIBIOFIBULAR LIGAMENT OF RIGHT ANKLE, INITIAL ENCOUNTER: ICD-10-CM

## 2023-07-31 PROCEDURE — 99024 POSTOP FOLLOW-UP VISIT: CPT

## 2023-07-31 PROCEDURE — 73610 X-RAY EXAM OF ANKLE: CPT | Mod: RT

## 2023-08-04 PROBLEM — S93.431A SYNDESMOTIC DISRUPTION OF RIGHT ANKLE, INITIAL ENCOUNTER: Status: ACTIVE | Noted: 2023-05-08

## 2023-08-04 PROBLEM — S82.841A CLOSED BIMALLEOLAR FRACTURE OF RIGHT ANKLE, INITIAL ENCOUNTER: Status: ACTIVE | Noted: 2023-05-08

## 2023-12-15 NOTE — ASU DISCHARGE PLAN (ADULT/PEDIATRIC) - WILL THE PATIENT ACCEPT THE PFIZER COVID-19 VACCINE IF ELIGIBLE AND IT IS AVAILABLE?
[facility] EMERGENCY SERVICES    Attending Attestation    Patient: Mikayla Chan Age: 66 year old Sex: female   MRN: [id number] : 1957 Encounter Date: 2023     Chief Complaint   Patient presents with    Breathing Problem       I supervised the PA fellow and agree with their note.  I independently performed a history and exam, reviewed pertinent results and discussed the case with the PA.  I agree with assessment and plan.          Clinical Impression     ED Diagnosis   1. Hypoxia        2. COVID-19 virus detected        3. PPHT (primary pulmonary hypertension) (CMD)              Disposition        Transfer to Another Facility 2023 10:26 PM  Mikayla Chan should be transferred out to [facility]     Sj Moore MD  [id number]     No

## (undated) DEVICE — DRILL BIT SYNTHES ORTHO QC 2.5X110MM

## (undated) DEVICE — GLV 7 PROTEXIS (WHITE)

## (undated) DEVICE — DRILL BIT SYNTHES ORTHO QC 2.7X125MM

## (undated) DEVICE — VISITEC 4X4

## (undated) DEVICE — SUT MONOSOF 3-0 18" C-14

## (undated) DEVICE — DRAPE C ARM UNIVERSAL

## (undated) DEVICE — SOL IRR POUR H2O 250ML

## (undated) DEVICE — GLV 8.5 PROTEXIS (WHITE)

## (undated) DEVICE — MARKING PEN W RULER

## (undated) DEVICE — BLADE SCALPEL SAFETYLOCK #10

## (undated) DEVICE — Device

## (undated) DEVICE — DRAPE IOBAN 23" X 23"

## (undated) DEVICE — SOL IRR BAG NS 0.9% 3000ML

## (undated) DEVICE — POSITIONER CARDIAC BUMP

## (undated) DEVICE — DRSG WEBRIL 6"

## (undated) DEVICE — SOL IRR POUR NS 0.9% 500ML

## (undated) DEVICE — GOWN TRIMAX LG

## (undated) DEVICE — DRSG ACE BANDAGE 4" NS

## (undated) DEVICE — VENODYNE/SCD SLEEVE CALF LARGE

## (undated) DEVICE — POSITIONER FOAM EGG CRATE ULNAR 2PCS (PINK)

## (undated) DEVICE — STRYKER INTERPULSE HANDPIECE W IRR SUCTION TUBE

## (undated) DEVICE — MEDICATION LABELS W MARKER

## (undated) DEVICE — POSITIONER CUSHION INSERT PRONE VIEW LG

## (undated) DEVICE — GLV 8 PROTEXIS (WHITE)

## (undated) DEVICE — DRSG STOCKINETTE IMPERVIOUS LG

## (undated) DEVICE — POSITIONER HEAD REST PRONE

## (undated) DEVICE — DRAPE U 47X51" NON STERILE

## (undated) DEVICE — SUCTION YANKAUER NO CONTROL VENT

## (undated) DEVICE — WARMING BLANKET UPPER ADULT

## (undated) DEVICE — GLV 6.5 PROTEXIS (WHITE)

## (undated) DEVICE — PACK EXTREMITY

## (undated) DEVICE — POSITIONER FOAM HEADREST (PINK)

## (undated) DEVICE — SUT POLYSORB 2-0 30" GS-21 UNDYED

## (undated) DEVICE — TOURNIQUET CUFF 42" DUAL PORT W PLC

## (undated) DEVICE — TOURNIQUET CUFF 34" DUAL PORT W PLC

## (undated) DEVICE — GLV 7.5 PROTEXIS (WHITE)

## (undated) DEVICE — DRSG WEBRIL 4"